# Patient Record
Sex: FEMALE | Race: WHITE | Employment: FULL TIME | URBAN - METROPOLITAN AREA
[De-identification: names, ages, dates, MRNs, and addresses within clinical notes are randomized per-mention and may not be internally consistent; named-entity substitution may affect disease eponyms.]

---

## 2025-06-11 ENCOUNTER — APPOINTMENT (OUTPATIENT)
Dept: PRIMARY CARE | Facility: CLINIC | Age: 55
End: 2025-06-11
Payer: COMMERCIAL

## 2025-06-11 ENCOUNTER — TELEPHONE (OUTPATIENT)
Dept: PRIMARY CARE | Facility: CLINIC | Age: 55
End: 2025-06-11

## 2025-06-11 VITALS
OXYGEN SATURATION: 99 % | WEIGHT: 133 LBS | BODY MASS INDEX: 23.57 KG/M2 | SYSTOLIC BLOOD PRESSURE: 118 MMHG | RESPIRATION RATE: 16 BRPM | HEIGHT: 63 IN | DIASTOLIC BLOOD PRESSURE: 80 MMHG | HEART RATE: 81 BPM

## 2025-06-11 DIAGNOSIS — Z13.31 POSITIVE SCREENING FOR DEPRESSION ON 9-ITEM PATIENT HEALTH QUESTIONNAIRE (PHQ-9): ICD-10-CM

## 2025-06-11 DIAGNOSIS — Z12.31 VISIT FOR SCREENING MAMMOGRAM: ICD-10-CM

## 2025-06-11 DIAGNOSIS — N95.1 MENOPAUSAL SYMPTOMS: ICD-10-CM

## 2025-06-11 DIAGNOSIS — Z12.11 COLON CANCER SCREENING: ICD-10-CM

## 2025-06-11 DIAGNOSIS — Z23 ENCOUNTER FOR IMMUNIZATION: ICD-10-CM

## 2025-06-11 DIAGNOSIS — R00.2 PALPITATIONS: Primary | ICD-10-CM

## 2025-06-11 DIAGNOSIS — Z13.220 LIPID SCREENING: ICD-10-CM

## 2025-06-11 DIAGNOSIS — H26.9 CATARACT OF BOTH EYES, UNSPECIFIED CATARACT TYPE: ICD-10-CM

## 2025-06-11 DIAGNOSIS — Z11.59 NEED FOR HEPATITIS C SCREENING TEST: ICD-10-CM

## 2025-06-11 DIAGNOSIS — F41.9 ANXIETY: ICD-10-CM

## 2025-06-11 DIAGNOSIS — Z11.4 ENCOUNTER FOR SCREENING FOR HIV: ICD-10-CM

## 2025-06-11 PROBLEM — Z78.9 CAFFEINE USE: Status: ACTIVE | Noted: 2025-06-11

## 2025-06-11 PROBLEM — I48.91 NEW ONSET ATRIAL FIBRILLATION (MULTI): Status: ACTIVE | Noted: 2022-12-17

## 2025-06-11 PROBLEM — F32.0 MILD MAJOR DEPRESSION: Status: ACTIVE | Noted: 2025-06-11

## 2025-06-11 PROBLEM — I48.91 NEW ONSET ATRIAL FIBRILLATION (MULTI): Status: RESOLVED | Noted: 2022-12-17 | Resolved: 2025-06-11

## 2025-06-11 PROCEDURE — 90677 PCV20 VACCINE IM: CPT | Performed by: FAMILY MEDICINE

## 2025-06-11 PROCEDURE — 1036F TOBACCO NON-USER: CPT | Performed by: FAMILY MEDICINE

## 2025-06-11 PROCEDURE — 3008F BODY MASS INDEX DOCD: CPT | Performed by: FAMILY MEDICINE

## 2025-06-11 PROCEDURE — 99203 OFFICE O/P NEW LOW 30 MIN: CPT | Performed by: FAMILY MEDICINE

## 2025-06-11 PROCEDURE — 90471 IMMUNIZATION ADMIN: CPT | Performed by: FAMILY MEDICINE

## 2025-06-11 PROCEDURE — 93000 ELECTROCARDIOGRAM COMPLETE: CPT | Performed by: FAMILY MEDICINE

## 2025-06-11 RX ORDER — ESTRADIOL 0.1 MG/G
2 CREAM VAGINAL DAILY
COMMUNITY
End: 2025-06-11 | Stop reason: SDUPTHER

## 2025-06-11 RX ORDER — PROGESTERONE 100 MG/1
100 CAPSULE ORAL NIGHTLY
COMMUNITY
Start: 2025-05-25 | End: 2025-06-11 | Stop reason: SDUPTHER

## 2025-06-11 RX ORDER — ESTRADIOL 0.1 MG/G
2 CREAM VAGINAL DAILY
COMMUNITY
Start: 2025-06-11

## 2025-06-11 RX ORDER — PROGESTERONE 100 MG/1
100 CAPSULE ORAL NIGHTLY
COMMUNITY
Start: 2025-06-11

## 2025-06-11 RX ORDER — FLUOXETINE 10 MG/1
10 CAPSULE ORAL DAILY
Qty: 30 CAPSULE | Refills: 1 | Status: SHIPPED | OUTPATIENT
Start: 2025-06-11 | End: 2025-08-10

## 2025-06-11 ASSESSMENT — ENCOUNTER SYMPTOMS
DIARRHEA: 0
DIZZINESS: 0
SORE THROAT: 0
DYSPHORIC MOOD: 0
NERVOUS/ANXIOUS: 0
FREQUENCY: 0
CHILLS: 0
POLYDIPSIA: 0
WHEEZING: 0
VOMITING: 0
DYSURIA: 0
SINUS PRESSURE: 0
UNEXPECTED WEIGHT CHANGE: 0
SINUS PAIN: 0
COUGH: 0
HEMATURIA: 0
CONFUSION: 0
CONSTIPATION: 0
ABDOMINAL PAIN: 0
LIGHT-HEADEDNESS: 0
SHORTNESS OF BREATH: 0
PALPITATIONS: 1
CHEST TIGHTNESS: 0
HEADACHES: 0
NAUSEA: 0
DIAPHORESIS: 0
ADENOPATHY: 0
POLYPHAGIA: 0
FEVER: 0
NUMBNESS: 0

## 2025-06-11 ASSESSMENT — PATIENT HEALTH QUESTIONNAIRE - PHQ9
5. POOR APPETITE OR OVEREATING: NOT AT ALL
SUM OF ALL RESPONSES TO PHQ QUESTIONS 1-9: 4
6. FEELING BAD ABOUT YOURSELF - OR THAT YOU ARE A FAILURE OR HAVE LET YOURSELF OR YOUR FAMILY DOWN: SEVERAL DAYS
7. TROUBLE CONCENTRATING ON THINGS, SUCH AS READING THE NEWSPAPER OR WATCHING TELEVISION: SEVERAL DAYS
9. THOUGHTS THAT YOU WOULD BE BETTER OFF DEAD, OR OF HURTING YOURSELF: NOT AT ALL
2. FEELING DOWN, DEPRESSED OR HOPELESS: NOT AT ALL
3. TROUBLE FALLING OR STAYING ASLEEP: SEVERAL DAYS
SUM OF ALL RESPONSES TO PHQ9 QUESTIONS 1 & 2: 0
4. FEELING TIRED OR HAVING LITTLE ENERGY: SEVERAL DAYS
8. MOVING OR SPEAKING SO SLOWLY THAT OTHER PEOPLE COULD HAVE NOTICED. OR THE OPPOSITE, BEING SO FIGETY OR RESTLESS THAT YOU HAVE BEEN MOVING AROUND A LOT MORE THAN USUAL: NOT AT ALL
1. LITTLE INTEREST OR PLEASURE IN DOING THINGS: NOT AT ALL

## 2025-06-11 ASSESSMENT — ANXIETY QUESTIONNAIRES
3. WORRYING TOO MUCH ABOUT DIFFERENT THINGS: SEVERAL DAYS
7. FEELING AFRAID AS IF SOMETHING AWFUL MIGHT HAPPEN: SEVERAL DAYS
GAD7 TOTAL SCORE: 5
2. NOT BEING ABLE TO STOP OR CONTROL WORRYING: NOT AT ALL
4. TROUBLE RELAXING: SEVERAL DAYS
5. BEING SO RESTLESS THAT IT IS HARD TO SIT STILL: NOT AT ALL
6. BECOMING EASILY ANNOYED OR IRRITABLE: SEVERAL DAYS
1. FEELING NERVOUS, ANXIOUS, OR ON EDGE: SEVERAL DAYS
IF YOU CHECKED OFF ANY PROBLEMS ON THIS QUESTIONNAIRE, HOW DIFFICULT HAVE THESE PROBLEMS MADE IT FOR YOU TO DO YOUR WORK, TAKE CARE OF THINGS AT HOME, OR GET ALONG WITH OTHER PEOPLE: SOMEWHAT DIFFICULT

## 2025-06-11 NOTE — ASSESSMENT & PLAN NOTE
- EKG today shows NSR. No ST changes  - check labs  - check holter monitor  - will be seeing cardiology end of July

## 2025-06-11 NOTE — TELEPHONE ENCOUNTER
Patient had blood work earlier this year (thinks in February but not sure) at [facility] in [place]. Please request results.     [phone]

## 2025-06-11 NOTE — PATIENT INSTRUCTIONS
Hellen Rodriguez ,    Thank you for coming in today. We at [facility] appreciate your trust in our care. If you have any questions or concerns about the care you received today, please do not hesitate to contact us at [phone].    The following instructions were discussed today:    - start fluoxetine 10 mg daily   - get labs. For blood work: Nothing to eat or drink for at least 10 hours prior. Okay for water or black coffee.   - do cologuard  - do heart monitor   - Follow up in 1 month to see how prozac is working   - Follow up in 3 months for in person visit

## 2025-06-11 NOTE — PROGRESS NOTES
Subjective   Patient ID: Hellen Rodriguez is a 54 y.o. female who presents for new to estab (Will be seeing a cardiologist for an EKG, in July for heart racing and palpitations/Menopausal symptoms, weight gain (belly mostly) she does have a gyn. Which she will be seeing next week.).    HPI   54 y.o. female here to establish care. Past medical history, past surgical history, medications, allergies, family history, and social history reviewed with patient and updated in chart.     Patient states she was hospitalized in 2022 for urinary retention thought to be due to contrave. While there, heart monitor did show a.fib. Saw cardiology as an outpatient. He was unable to find any evidence of documented a.fib in the hospital monitoring. Felt it was possible just sinus tachycardia. Echo at that time was normal. ZIO was ordered but never completed. She was never on anticoagulants or betablockers.    Has been noticing heart racing over the past 4-6 weeks. First noticed beginning of May. Was just sitting in a chair and heart starting racing. Looked at Apple Watch and heart rate was in the 170s. This lasted about 2 hours (heart rate between 100 and 170). A few weeks later, she was woken out of sleep by racing heart. Heart rate was 188. Lasted for about 45 minutes. No further episodes.     Has noticed abdominal weight gain. Started happening in her 40s but seems a lot worse over the past year or so. Sees a functional doctor and she was put on semaglutide. She did have weight loss with this but also had thinning and breakage of hair. Stopped semaglutide and weight came back.    She is postmenopausal. Last menses was several years ago.     She denies feeling depressed. Does feel like she has been anxious. She is worrying a lot. She has been on prozac in the past but stopped it when she got pregnant and never went back on it. Her daughter is now 14.     Review of Systems   Constitutional:  Negative for chills, diaphoresis, fever  "and unexpected weight change.   HENT:  Negative for congestion, sinus pressure, sinus pain, sneezing and sore throat.    Respiratory:  Negative for cough, chest tightness, shortness of breath and wheezing.    Cardiovascular:  Positive for palpitations. Negative for chest pain and leg swelling.   Gastrointestinal:  Negative for abdominal pain, constipation, diarrhea, nausea and vomiting.   Endocrine: Negative for cold intolerance, heat intolerance, polydipsia, polyphagia and polyuria.   Genitourinary:  Negative for dysuria, frequency, hematuria and urgency.   Neurological:  Negative for dizziness, syncope, light-headedness, numbness and headaches.   Hematological:  Negative for adenopathy.   Psychiatric/Behavioral:  Negative for confusion and dysphoric mood. The patient is not nervous/anxious.        Objective   /80 (BP Location: Right arm, Patient Position: Sitting, BP Cuff Size: Adult)   Pulse 81   Resp 16   Ht 1.6 m (5' 3\")   Wt 60.3 kg (133 lb)   SpO2 99%   BMI 23.56 kg/m²     Physical Exam  Vitals and nursing note reviewed.   Constitutional:       General: She is not in acute distress.     Appearance: Normal appearance.   HENT:      Head: Normocephalic and atraumatic.      Nose: Nose normal.   Eyes:      Extraocular Movements: Extraocular movements intact.      Conjunctiva/sclera: Conjunctivae normal.      Pupils: Pupils are equal, round, and reactive to light.   Cardiovascular:      Rate and Rhythm: Normal rate and regular rhythm.      Heart sounds: No murmur heard.     No friction rub. No gallop.   Pulmonary:      Effort: Pulmonary effort is normal.      Breath sounds: Normal breath sounds. No wheezing, rhonchi or rales.   Abdominal:      General: Bowel sounds are normal. There is no distension.      Palpations: Abdomen is soft.      Tenderness: There is no abdominal tenderness.   Musculoskeletal:         General: Normal range of motion.      Cervical back: Normal range of motion and neck supple. "   Skin:     General: Skin is warm and dry.   Neurological:      General: No focal deficit present.      Mental Status: She is alert and oriented to person, place, and time.      Deep Tendon Reflexes: Reflexes normal.   Psychiatric:         Mood and Affect: Mood normal.         Behavior: Behavior normal.         Thought Content: Thought content normal.         Judgment: Judgment normal.         Assessment/Plan   Problem List Items Addressed This Visit           ICD-10-CM    Anxiety F41.9    - start fluoxetine 10 mg daily          Relevant Medications    FLUoxetine (PROzac) 10 mg capsule    Other Relevant Orders    Vitamin B12    Vitamin D 25-Hydroxy,Total (for eval of Vitamin D levels)    CBC and Auto Differential    Comprehensive Metabolic Panel    TSH with reflex to Free T4 if abnormal    Cortisol    Cataracts, bilateral H26.9    - follows with ophthalmology          Colon cancer screening Z12.11    - cologuard ordered          Relevant Orders    Cologuard® colon cancer screening    Encounter for immunization Z23    - PCV 20 administered          Relevant Orders    Pneumococcal conjugate vaccine, 20-valent (PREVNAR 20) (Completed)    Menopausal symptoms N95.1    - continue estradiol cream and progesterone          Relevant Medications    progesterone (Prometrium) 100 mg capsule    estradiol (Estrace) 0.01 % (0.1 mg/gram) vaginal cream    Other Relevant Orders    Vitamin B12    Vitamin D 25-Hydroxy,Total (for eval of Vitamin D levels)    CBC and Auto Differential    Comprehensive Metabolic Panel    TSH with reflex to Free T4 if abnormal    Cortisol    Palpitations - Primary R00.2    - EKG today shows NSR. No ST changes  - check labs  - check holter monitor  - will be seeing cardiology end of July          Relevant Orders    ECG 12 Lead (Completed)    Holter Or Event Cardiac Monitor    Vitamin B12    Vitamin D 25-Hydroxy,Total (for eval of Vitamin D levels)    CBC and Auto Differential    Comprehensive Metabolic  Panel    TSH with reflex to Free T4 if abnormal    Cortisol    Positive screening for depression on 9-item Patient Health Questionnaire (PHQ-9) Z13.31    - patient denies feeling depressed. Does feel anxious, so starting prozac for that          Visit for screening mammogram Z12.31    - plans to get through her gynecologist          Other Visit Diagnoses         Codes      Lipid screening     Z13.220    Relevant Orders    Lipid Panel      Encounter for screening for HIV     Z11.4    Relevant Orders    HIV 1/2 Antigen/Antibody Screen with Reflex to Confirmation      Need for hepatitis C screening test     Z11.59    Relevant Orders    Hepatitis C Antibody

## 2025-06-18 ENCOUNTER — TELEPHONE (OUTPATIENT)
Dept: PRIMARY CARE | Facility: CLINIC | Age: 55
End: 2025-06-18
Payer: COMMERCIAL

## 2025-06-18 NOTE — TELEPHONE ENCOUNTER
Benadryl is her best bet. If it worsens, gets bigger, gets more painful, gets red, gets hot needs to go to ED

## 2025-06-18 NOTE — TELEPHONE ENCOUNTER
Pt gorge stating she got a pneumonia vaccine in the office 6/11/25 she started with a reaction to the vaccine yesterday, has hives in the area where shot was given, it was quarter size yesterday and today is about the size of a silver dollar, she has put benadryl cream on the area and taken benadryl, wants to know if there is anything else you might recommend?

## 2025-06-25 DIAGNOSIS — Z12.31 ENCOUNTER FOR SCREENING MAMMOGRAM FOR BREAST CANCER: ICD-10-CM

## 2025-07-04 DIAGNOSIS — F41.9 ANXIETY: ICD-10-CM

## 2025-07-08 LAB
CREATININE (MG/DL) IN SER/PLAS EXTERNAL: 0.65 MG/DL
GLOMERULAR FILTRATION RATE ML/MIN/1.73 SQ M.PREDICTED EXTERNAL: 104 ML/MIN/1.73M*2
THYROTROPIN (MIU/L) IN SER/PLAS BY DETECTION LIMIT <= 0.05 MIU/L EXTERNAL: 2.81 MIU/L

## 2025-07-08 RX ORDER — FLUOXETINE 10 MG/1
10 CAPSULE ORAL DAILY
Qty: 90 CAPSULE | Refills: 1 | Status: SHIPPED | OUTPATIENT
Start: 2025-07-08

## 2025-07-11 ENCOUNTER — APPOINTMENT (OUTPATIENT)
Dept: PRIMARY CARE | Facility: CLINIC | Age: 55
End: 2025-07-11
Payer: COMMERCIAL

## 2025-07-11 DIAGNOSIS — F41.9 ANXIETY: Primary | ICD-10-CM

## 2025-07-11 PROBLEM — Z13.31 POSITIVE SCREENING FOR DEPRESSION ON 9-ITEM PATIENT HEALTH QUESTIONNAIRE (PHQ-9): Status: RESOLVED | Noted: 2025-06-11 | Resolved: 2025-07-11

## 2025-07-11 PROCEDURE — 1036F TOBACCO NON-USER: CPT | Performed by: FAMILY MEDICINE

## 2025-07-11 PROCEDURE — 99213 OFFICE O/P EST LOW 20 MIN: CPT | Performed by: FAMILY MEDICINE

## 2025-07-11 ASSESSMENT — ENCOUNTER SYMPTOMS
CONFUSION: 0
POLYPHAGIA: 0
LIGHT-HEADEDNESS: 0
NAUSEA: 0
VOMITING: 0
DIARRHEA: 0
ADENOPATHY: 0
DYSPHORIC MOOD: 0
ABDOMINAL PAIN: 0
FREQUENCY: 0
DYSURIA: 0
SINUS PAIN: 0
DIAPHORESIS: 0
NERVOUS/ANXIOUS: 0
POLYDIPSIA: 0
HEADACHES: 0
SHORTNESS OF BREATH: 0
UNEXPECTED WEIGHT CHANGE: 0
CHILLS: 0
SINUS PRESSURE: 0
PALPITATIONS: 0
NUMBNESS: 0
COUGH: 0
CHEST TIGHTNESS: 0
WHEEZING: 0
FEVER: 0
HEMATURIA: 0
CONSTIPATION: 0
SORE THROAT: 0
DIZZINESS: 0

## 2025-07-11 NOTE — PATIENT INSTRUCTIONS
Hellen Rodriguez ,    Thank you for coming in today. We at [facility] appreciate your trust in our care. If you have any questions or concerns about the care you received today, please do not hesitate to contact us at [phone].    The following instructions were discussed today:    - Follow up 9/18/2025 as scheduled.

## 2025-07-11 NOTE — PROGRESS NOTES
Subjective   Patient ID: Hellen Rodriguez is a 54 y.o. female who presents for Follow-up.    Virtual or Telephone Consent    An interactive audio and video telecommunication system which permits real time communications between the patient (at the originating site) and provider (at the distant site) was utilized to provide this telehealth service.   Verbal consent was requested and obtained from Hellen Rodriguez on this date, 07/11/25 for a telehealth visit and the patient's location was confirmed at the time of the visit.     HPI  Follow up anxiety. Started fluoxetine at last visit. She feels that this is helping. Denies medication side effects.     Review of Systems   Constitutional:  Negative for chills, diaphoresis, fever and unexpected weight change.   HENT:  Negative for congestion, sinus pressure, sinus pain, sneezing and sore throat.    Respiratory:  Negative for cough, chest tightness, shortness of breath and wheezing.    Cardiovascular:  Negative for chest pain, palpitations and leg swelling.   Gastrointestinal:  Negative for abdominal pain, constipation, diarrhea, nausea and vomiting.   Endocrine: Negative for cold intolerance, heat intolerance, polydipsia, polyphagia and polyuria.   Genitourinary:  Negative for dysuria, frequency, hematuria and urgency.   Neurological:  Negative for dizziness, syncope, light-headedness, numbness and headaches.   Hematological:  Negative for adenopathy.   Psychiatric/Behavioral:  Negative for confusion and dysphoric mood. The patient is not nervous/anxious.          Assessment/Plan   Problem List Items Addressed This Visit       Anxiety - Primary    - improved. Continue fluoxetine

## 2025-09-18 ENCOUNTER — APPOINTMENT (OUTPATIENT)
Dept: PRIMARY CARE | Facility: CLINIC | Age: 55
End: 2025-09-18
Payer: COMMERCIAL